# Patient Record
Sex: FEMALE | Race: OTHER
[De-identification: names, ages, dates, MRNs, and addresses within clinical notes are randomized per-mention and may not be internally consistent; named-entity substitution may affect disease eponyms.]

---

## 2018-04-22 ENCOUNTER — HOSPITAL ENCOUNTER (EMERGENCY)
Dept: HOSPITAL 97 - ER | Age: 48
Discharge: HOME | End: 2018-04-22
Payer: COMMERCIAL

## 2018-04-22 DIAGNOSIS — F41.9: Primary | ICD-10-CM

## 2018-04-22 PROCEDURE — 99284 EMERGENCY DEPT VISIT MOD MDM: CPT

## 2018-04-22 NOTE — ER
Nurse's Notes                                                                                     

 [facility]                                                                

Name: Eileen Evangelista                                                                                   

Age: 47 yrs                                                                                       

Sex: Female                                                                                       

: 1970                                                                                   

MRN: [id number]                                                                                   

Arrival Date: 2018                                                                          

Time: 04:15                                                                                       

Account#: [id number]                                                                            

Bed 5                                                                                             

Private MD:                                                                                       

Diagnosis: Anxiety disorder, unspecified                                                          

                                                                                                  

Presentation:                                                                                     

                                                                                             

04:20 Presenting complaint: Patient states: SOB while at work after having 5000mg B12, 2 cups ak1 

      coffee and 1 energy drink. pt hyperventilating in lobby. pt with same s/s one other         

      time. Transition of care: patient was not received from another setting of care. Onset      

      of symptoms was 2018. Initial Sepsis Screen: Does the patient meet any 2          

      criteria? No. Patient's initial sepsis screen is negative. Does the patient have a          

      suspected source of infection? No. Patient's initial sepsis screen is negative. Care        

      prior to arrival: None.                                                                     

04:20 Method Of Arrival: Wheelchair                                                           ak1 

04:20 Acuity: SAMANTHA 4                                                                           ak1 

                                                                                                  

OB/GYN:                                                                                           

05:38 LMP N/A - Irregular menses                                                              bp  

                                                                                                  

Historical:                                                                                       

- Allergies:                                                                                      

04:22 No Known Allergies;                                                                     ak1 

- Home Meds:                                                                                      

04:22 None [Active];                                                                          ak1 

- PMHx:                                                                                           

04:22 None;                                                                                   ak1 

- PSHx:                                                                                           

04:22 etopic pregnancy;                                                                       ak1 

                                                                                                  

- Immunization history:: Adult Immunizations unknown.                                             

- Social history:: Smoking status: unknown.                                                       

- Family history:: not pertinent.                                                                 

                                                                                                  

                                                                                                  

Screenin:21 Abuse screen: Denies threats or abuse. Denies injuries from another. Nutritional        bp  

      screening: No deficits noted. Tuberculosis screening: No symptoms or risk factors           

      identified. Fall Risk None identified.                                                      

                                                                                                  

Assessment:                                                                                       

04:21 Reassessment: 48YO AF P/W SOB 2/2 OVERUSE OF STIMULANTS, SAME PROBLEM IN PAST UNDER     bp  

      SIMILAR CIRCUMSTANCES. General: Appears distressed, comfortable, Behavior is                

      appropriate for age, agitated, anxious. Pain: Denies pain. Neuro: Level of                  

      Consciousness is awake, alert, obeys commands, Oriented to person, place, time,             

      situation, Appropriate for age. Cardiovascular: Rhythm is sinus tachycardia.                

      Respiratory: Airway is patent Respiratory effort is even, unlabored, Respiratory            

      pattern is regular, symmetrical, Breath sounds are clear bilaterally. GI: No signs          

      and/or symptoms were reported involving the gastrointestinal system. : No signs           

      and/or symptoms were reported regarding the genitourinary system. EENT: No deficits         

      noted. Derm: No deficits noted. Musculoskeletal: Circulation, motion, and sensation         

      intact. Range of motion: intact in all extremities.                                         

05:16 Reassessment: S/S RELIEVED. DISPO PENDING.                                              bp  

05:38 Reassessment: PT D/C HOME AMBULATORY WITH FAMILY, DX WITH ANXIETY.                      bp  

                                                                                                  

Vital Signs:                                                                                      

04:22  / 102; Pulse 130; Resp 30; Pulse Ox 100% on R/A; Weight 54.88 kg (R); Height 5   ak1 

      ft. 2 in. (157.48 cm) (R); Pain 0/10;                                                       

05:00  / 89; Pulse 89; Resp 14; Pulse Ox 100% ;                                         bp  

04:22 Body Mass Index 22.13 (54.88 kg, 157.48 cm)                                             ak1 

                                                                                                  

ED Course:                                                                                        

04:15 Patient arrived in ED.                                                                  em1 

04:15 Pio Pearce MD is Attending Physician.                                           ma2 

04:20 Lucas Hernández, RN is Primary Nurse.                                                    bp  

04:21 Triage completed.                                                                       ak1 

04:21 Patient has correct armband on for positive identification. Bed in low position. Call   bp  

      light in reach. Side rails up X2. Adult w/ patient.                                         

04:22 Arm band placed on Patient placed in an exam room, on a stretcher, on pulse oximetry,   ak1 

      Patient notified of wait time.                                                              

05:37 No provider procedures requiring assistance completed. Patient did not have IV access   bp  

      during this emergency room visit.                                                           

                                                                                                  

Administered Medications:                                                                         

04:30 Drug: Ativan 2 mg Route: PO;                                                            bp  

05:22 Follow up: Response: Anxiety decreased                                                  bp  

                                                                                                  

                                                                                                  

Outcome:                                                                                          

05:27 Discharge ordered by MD.                                                                ma2 

05:37 Discharged to home ambulatory, with family.                                             bp  

05:37 Condition: stable                                                                           

05:37 Discharge instructions given to patient, Instructed on discharge instructions,              

      Demonstrated understanding of instructions, follow-up care, medications, Prescriptions      

      given X 1.                                                                                  

05:38 Patient left the ED.                                                                    bp  

                                                                                                  

Signatures:                                                                                       

Rich Serrano                               em1                                                  

Krystal Estrada, RN                       RN   ak1                                                  

Lucas Hernández, RN                      RN   bp                                                   

Pio Pearce MD MD ma2                                                  

                                                                                                  

Corrections: (The following items were deleted from the chart)                                    

05:18 05:16  / 89; Pulse 89bpm; Resp 14bpm; Pulse Ox 100%; bp                           bp  

                                                                                                  

**************************************************************************************************

## 2018-04-22 NOTE — EDPHYS
Physician Documentation                                                                           

 [facility]                                                                

Name: Eileen Evangelista                                                                                   

Age: 47 yrs                                                                                       

Sex: Female                                                                                       

: 1970                                                                                   

MRN: [id number]                                                                                   

Arrival Date: 2018                                                                          

Time: 04:15                                                                                       

Account#: [id number]                                                                            

Bed 5                                                                                             

Private MD:                                                                                       

ED Physician Pio Pearce                                                                    

HPI:                                                                                              

                                                                                             

04:36 This 47 yrs old Female presents to ER via Wheelchair with complaints of anxiety         ma2 

      hyperventilation.                                                                           

04:36 Onset: The symptoms/episode began/occurred gradually, 1 hour(s) ago. Possible causes:   ma2 

      was at work and drank energy drink and coffee started feeling agitated and                  

      hyperventilating, has had like this before . Associated signs and symptoms: Pertinent       

      positives: agitation, Pertinent negatives: abdominal pain, ataxia, blurred vision,          

      chest pain, combativeness, confusion, diaphoresis, diarrhea, dizziness, headache,           

      lightheadedness. Current symptoms: In the emergency department the patient's symptoms       

      have improved. The patient has experienced similar episodes in the past.                    

                                                                                                  

OB/GYN:                                                                                           

05:38 LMP N/A - Irregular menses                                                              bp  

                                                                                                  

Historical:                                                                                       

- Allergies:                                                                                      

04:22 No Known Allergies;                                                                     ak1 

- Home Meds:                                                                                      

04:22 None [Active];                                                                          ak1 

- PMHx:                                                                                           

04:22 None;                                                                                   ak1 

- PSHx:                                                                                           

04:22 etopic pregnancy;                                                                       ak1 

                                                                                                  

- Immunization history:: Adult Immunizations unknown.                                             

- Social history:: Smoking status: unknown.                                                       

- Family history:: not pertinent.                                                                 

                                                                                                  

                                                                                                  

ROS:                                                                                              

04:36 Constitutional: Negative for fever, chills, and weight loss, Eyes: Negative for injury, ma2 

      pain, redness, and discharge, Neck: Negative for injury, pain, and swelling,                

      Respiratory: Negative for shortness of breath, cough, wheezing, and pleuritic chest         

      pain, Back: Negative for injury and pain.                                                   

04:36 All other systems are negative.                                                             

                                                                                                  

Exam:                                                                                             

04:36 Eyes:  Pupils equal round and reactive to light, extra-ocular motions intact.  Lids and ma2 

      lashes normal.  Conjunctiva and sclera are non-icteric and not injected.  Cornea within     

      normal limits.  Periorbital areas with no swelling, redness, or edema. ENT:  Nares          

      patent. No nasal discharge, no septal abnormalities noted.  Tympanic membranes are          

      normal and external auditory canals are clear.  Oropharynx with no redness, swelling,       

      or masses, exudates, or evidence of obstruction, uvula midline.  Mucous membranes           

      moist. Neck:  Trachea midline, no thyromegaly or masses palpated, and no cervical           

      lymphadenopathy.  Supple, full range of motion without nuchal rigidity, or vertebral        

      point tenderness.  No Meningismus. Chest/axilla:  Normal chest wall appearance and          

      motion.  Nontender with no deformity.  No lesions are appreciated. Cardiovascular:          

      Regular rate and rhythm with a normal S1 and S2.  No gallops, murmurs, or rubs.  Normal     

      PMI, no JVD.  No pulse deficits. Respiratory:  Lungs have equal breath sounds               

      bilaterally, clear to auscultation and percussion.  No rales, rhonchi or wheezes noted.     

       No increased work of breathing, no retractions or nasal flaring. Abdomen/GI:  Soft,        

      non-tender, with normal bowel sounds.  No distension or tympany.  No guarding or            

      rebound.  No evidence of tenderness throughout. MS/ Extremity:  Pulses equal, no            

      cyanosis.  Neurovascular intact.  Full, normal range of motion. Neuro:  Awake and           

      alert, GCS 15, oriented to person, place, time, and situation.  Cranial nerves II-XII       

      grossly intact.  Motor strength 5/5 in all extremities.  Sensory grossly intact.            

      Cerebellar exam normal.  Normal gait.                                                       

04:36 Constitutional: The patient appears agitated, anxious, hyperventilating                     

                                                                                                  

Vital Signs:                                                                                      

04:22  / 102; Pulse 130; Resp 30; Pulse Ox 100% on R/A; Weight 54.88 kg (R); Height 5   ak1 

      ft. 2 in. (157.48 cm) (R); Pain 0/10;                                                       

05:00  / 89; Pulse 89; Resp 14; Pulse Ox 100% ;                                         bp  

04:22 Body Mass Index 22.13 (54.88 kg, 157.48 cm)                                             ak1 

                                                                                                  

MDM:                                                                                              

04:15 Patient medically screened.                                                             ma2 

04:36 Differential Diagnosis: overdose, anxiety, ppanic attack, conversion reaction .         ma2 

05:26 Data reviewed: vital signs, nurses notes, EKG. Test interpretation: by ED physician or  ma2 

      midlevel provider: ECG. Counseling: I had a detailed discussion with the patient and/or     

      guardian regarding: the historical points, exam findings, and any diagnostic results        

      supporting the discharge/admit diagnosis, the presence of at least one elevated blood       

      pressure reading (>120/80) during this emergency department visit, the need for             

      outpatient follow up. Response to treatment: the patient's symptoms have resolved after     

      treatment.                                                                                  

                                                                                                  

                                                                                             

05:04 Order name: EKG; Complete Time: : 

                                                                                                  

Administered Medications:                                                                         

04:30 Drug: Ativan 2 mg Route: PO;                                                            bp  

05:22 Follow up: Response: Anxiety decreased                                                  bp  

                                                                                                  

                                                                                                  

Disposition:                                                                                      

18 05:27 Discharged to Home. Impression: Anxiety disorder, unspecified.                     

- Condition is Stable.                                                                            

                                                                                                  

- Prescriptions for Valium 5 mg Oral Tablet - take 1 tablet by ORAL route every 8 hours           

  As needed; 10 tablet.                                                                           

- Medication Reconciliation Form, Thank You Letter, Antibiotic Education, Prescription            

  Opioid Use form.                                                                                

- Follow up: Private Physician; When: Tomorrow; Reason: Continuance of care.                      

- Problem is new.                                                                                 

- Symptoms are resolved.                                                                          

                                                                                                  

                                                                                                  

                                                                                                  

Signatures:                                                                                       

Krystal Estrada, RN                       RN   ak1                                                  

Lucas Hernández RN                      RN   bp                                                   

iPo Pearce MD MD   ma2                                                  

                                                                                                  

**************************************************************************************************